# Patient Record
Sex: MALE | Race: BLACK OR AFRICAN AMERICAN | NOT HISPANIC OR LATINO | Employment: FULL TIME | URBAN - METROPOLITAN AREA
[De-identification: names, ages, dates, MRNs, and addresses within clinical notes are randomized per-mention and may not be internally consistent; named-entity substitution may affect disease eponyms.]

---
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
## 2022-11-13 ENCOUNTER — HOSPITAL ENCOUNTER (EMERGENCY)
Facility: CLINIC | Age: 22
Discharge: HOME | End: 2022-11-13
Attending: EMERGENCY MEDICINE | Admitting: EMERGENCY MEDICINE
Payer: COMMERCIAL

## 2022-11-13 VITALS
SYSTOLIC BLOOD PRESSURE: 136 MMHG | HEART RATE: 81 BPM | TEMPERATURE: 98.1 F | OXYGEN SATURATION: 100 % | DIASTOLIC BLOOD PRESSURE: 69 MMHG | RESPIRATION RATE: 18 BRPM

## 2022-11-13 DIAGNOSIS — B49 FUNGAL INFECTION: ICD-10-CM

## 2022-11-13 PROCEDURE — 76882 US LMTD JT/FCL EVL NVASC XTR: CPT | Mod: LT

## 2022-11-13 PROCEDURE — 99284 EMERGENCY DEPT VISIT MOD MDM: CPT | Mod: 25

## 2022-11-13 ASSESSMENT — ENCOUNTER SYMPTOMS
FEVER: 0
DIAPHORESIS: 0
NAUSEA: 0
VOMITING: 0
CHILLS: 0

## 2022-11-13 NOTE — ED PROVIDER NOTES
History   Chief Complaint:  Rash     The history is provided by the patient.      Mansoor Jarrell is an otherwise healthy 21 year old male who presents with a rash under his left arm pit for the past week. There was no initial injuries to the area. He was previously seen by urgent care earlier today, and the provider was concerned about some swelling under his left chest and thought it could be a cyst. He was then advised to go to the ED to have an ultrasound conducted. He is not familiar with this rash. Pertinent past family history includes his father who has diabetes mellitus, but he does not have this. He is not currently on prescription medications. He denies fever, chills, diaphoresis, nausea, vomiting, or chest pain.     Review of Systems   Constitutional: Negative for chills, diaphoresis and fever.   Cardiovascular: Negative for chest pain.   Gastrointestinal: Negative for nausea and vomiting.   Skin: Positive for rash.   All other systems reviewed and are negative.    Allergies:  No Known Allergies    Medications:  The patient denies current use of medications.     Past Medical History:     Acanthosis nigricans   Otitis media      Past Surgical History:    The patient denies pertinent past surgical history.     Family History:    Father - DM    Social History:  The patient presents to the ED alone via private vehicle   PCP: No Ref-Primary, Physician     Physical Exam     Patient Vitals for the past 24 hrs:   BP Temp Pulse Resp SpO2   11/13/22 1011 136/69 98.1  F (36.7  C) 81 18 100 %       Physical Exam  General: Sitting on the ED bed, no distress  HEENT: Normocephalic, atraumatic  Cardiac: Radial pulses 2+, regular rate and rhythm  Pulm: Breathing comfortably, no accessory muscle usage, no conversational dyspnea, and lungs clear bilaterally  GI: Abdomen soft, nontender, no rigidity or guarding  MSK: Very subtle prominence of the left pec compared to the right that is likely baseline, no point  tenderness to palpation over the same and no wound or erythema or fluctuance or external evidence of infection.  Mild tenderness to palpation of the anterior left axilla near the origin of the left deltoid without palpable mass, fluctuance or wound.  Possible subtle dermatitis over the same area.  Upper arms are without asymmetric swelling bilaterally.  Skin: Warm and dry  Neuro: Sensorimotor intact extremities x4  Psych: Normal mood and affect     Emergency Department Course   Imaging:  POC US SOFT TISSUE   Final Result     Soft Tissue Ultrasound      Procedure Name: POC Ultrasound Soft Tissue Exam      Indication: Pain      Views: Skin and Subcutaneous tissue location left axilla      Findings: No fluid collection and No cobblestoning, visualized portions of the axillary vein are compressible      Impression: No evidence of cellulitis or abscess and no evidence of left upper extremity proximal DVT      Study performed by: NURIS BAZAN MD       Images archived: Yes        Report per myself    Laboratory:  Labs Ordered and Resulted from Time of ED Arrival to Time of ED Departure - No data to display       Emergency Department Course:    Reviewed:  I reviewed nursing notes, vitals and past medical history    Assessments:  1114 I obtained history and examined the patient as noted above.   1124 I rechecked the patient and performed an ultrasound on the patient. I explained findings and discussed plan for discharge home.    Disposition:  The patient was discharged to home.     Impression & Plan   Medical Decision Makin-year-old male presents as above with concern for left axillary abscess after being seen in urgent care earlier today.  No signs of that on exam and no visible fluid collection on ultrasound at the bedside as documented above.  The patient does have some asymmetry to his pecs which I suspect is chronic as there are no other signs or symptoms of significant infection.  His skin exam of the left axilla  is consistent with yeast infection and he will treat that as an outpatient with over-the-counter topical antifungals.  Plan is for discharge home with outpatient follow-up as indicated.    Diagnosis:    ICD-10-CM    1. Fungal infection  B49             Scribe Disclosure:  I, Ediwn Cunningham, am serving as a scribe at 11:10 AM on 11/13/2022 to document services personally performed by Shola Armstrong MD based on my observations and the provider's statements to me.        Shola Armstrong MD  11/13/22 1301

## 2022-11-22 ENCOUNTER — HOSPITAL ENCOUNTER (EMERGENCY)
Facility: CLINIC | Age: 22
Discharge: HOME | End: 2022-11-22
Attending: EMERGENCY MEDICINE | Admitting: EMERGENCY MEDICINE
Payer: COMMERCIAL

## 2022-11-22 VITALS
WEIGHT: 290 LBS | SYSTOLIC BLOOD PRESSURE: 119 MMHG | HEART RATE: 79 BPM | TEMPERATURE: 97.8 F | RESPIRATION RATE: 18 BRPM | OXYGEN SATURATION: 100 % | DIASTOLIC BLOOD PRESSURE: 56 MMHG

## 2022-11-22 DIAGNOSIS — H57.11 ACUTE RIGHT EYE PAIN: ICD-10-CM

## 2022-11-22 PROCEDURE — 99282 EMERGENCY DEPT VISIT SF MDM: CPT

## 2022-11-22 ASSESSMENT — ENCOUNTER SYMPTOMS: EYE PAIN: 1

## 2022-11-22 NOTE — ED TRIAGE NOTES
A&O x4, ABCs intact. Pt presents with intermittent right eye pain that started around 1400 yesterday. Pt states that when it hurts it comes on strong. Pt's eye does not appear more red than the left eye.         Triage Assessment     Row Name 11/22/22 0249       Triage Assessment (Adult)    Airway WDL WDL       Respiratory WDL    Respiratory WDL WDL       Cardiac WDL    Cardiac WDL WDL

## 2022-11-22 NOTE — ED PROVIDER NOTES
History     Chief Complaint:  Eye Pain     The history is provided by the patient.      Mansoor Jarrell is a 21 year old male who presents with eye pain. The patient reports that this morning he woke up and couldn't open his right eye. He explains that blinking is painful and that eyelid movement produced a scratching sensation. He denies use of contacts or any recent injury to the eye. No grinding of metal or other occupational risks for FB to the eye.     Review of Systems   Eyes: Positive for pain.   All other systems reviewed and are negative.    Allergies:  No known drug allergies    Medications:  The patient is not currently taking any prescribed medications.    Past Medical History:     The patient does not have any past pertinent medical history.     Social History:  The patient presents to the ED alone. He arrived via private vehicle.    Physical Exam     Patient Vitals for the past 24 hrs:   BP Temp Temp src Pulse Resp SpO2 Weight   11/22/22 0250 119/56 97.8  F (36.6  C) Temporal 79 18 100 % 131.5 kg (290 lb)     Physical Exam  Nursing note and vitals reviewed.  Constitutional: Cooperative.   HENT: No foreign bodies on cornea or with eversion of eyelid, no inflammation or drainage. No corneal abrasion or ulceration on fluorescein stain. Intraocular pressure is 14.  Mouth/Throat: Mucous membranes are normal.   Eyes: Pupils are equal, round, and reactive to light. EOMI.   Pulmonary/Chest: Effort normal.   Neurological: Alert.   Skin: Skin is warm and dry.   Psychiatric: Normal mood and affect.     Emergency Department Course     Reviewed:  I reviewed nursing notes, vitals, past medical history and Care Everywhere    Assessments:  0539 I obtained history and examined the patient as noted above.     Disposition:  The patient was discharged to home.     Impression & Plan     Medical Decision Making:  Mansoor Jarrell is a 21 year old male who presents with right eye pain. He describes a foreign  body sensation. No foreign body identified with eversion or on the cornea itself. No corneal abrasions or ulcerations. His pressure is normal, speaking against glaucoma. At this time no exact cause of symptoms are found though I suspect some type of eye irritation. If this persists I will have him follow up with [facility] for dilated and formal eye exam. No loss of vision or other concerning symptoms at this time.    Diagnosis:    ICD-10-CM    1. Acute right eye pain  H57.11           Scribe Disclosure:  I, Moreno Peterson, am serving as a scribe at 5:39 AM on 11/22/2022 to document services personally performed by Raghavendra Ojeda MD based on my observations and the provider's statements to me.        Raghavendra Ojeda MD  11/22/22 0613